# Patient Record
Sex: MALE | Race: WHITE | NOT HISPANIC OR LATINO
[De-identification: names, ages, dates, MRNs, and addresses within clinical notes are randomized per-mention and may not be internally consistent; named-entity substitution may affect disease eponyms.]

---

## 2019-06-17 PROBLEM — Z00.00 ENCOUNTER FOR PREVENTIVE HEALTH EXAMINATION: Status: ACTIVE | Noted: 2019-06-17

## 2019-06-21 ENCOUNTER — APPOINTMENT (OUTPATIENT)
Dept: OTOLARYNGOLOGY | Facility: CLINIC | Age: 39
End: 2019-06-21
Payer: COMMERCIAL

## 2019-06-21 VITALS
BODY MASS INDEX: 26.48 KG/M2 | WEIGHT: 185 LBS | DIASTOLIC BLOOD PRESSURE: 65 MMHG | HEIGHT: 70 IN | SYSTOLIC BLOOD PRESSURE: 122 MMHG | HEART RATE: 62 BPM

## 2019-06-21 DIAGNOSIS — Z80.3 FAMILY HISTORY OF MALIGNANT NEOPLASM OF BREAST: ICD-10-CM

## 2019-06-21 DIAGNOSIS — J34.2 DEVIATED NASAL SEPTUM: ICD-10-CM

## 2019-06-21 DIAGNOSIS — J31.0 CHRONIC RHINITIS: ICD-10-CM

## 2019-06-21 DIAGNOSIS — Z78.9 OTHER SPECIFIED HEALTH STATUS: ICD-10-CM

## 2019-06-21 DIAGNOSIS — Z87.09 PERSONAL HISTORY OF OTHER DISEASES OF THE RESPIRATORY SYSTEM: ICD-10-CM

## 2019-06-21 DIAGNOSIS — Z80.8 FAMILY HISTORY OF MALIGNANT NEOPLASM OF OTHER ORGANS OR SYSTEMS: ICD-10-CM

## 2019-06-21 DIAGNOSIS — H61.23 IMPACTED CERUMEN, BILATERAL: ICD-10-CM

## 2019-06-21 DIAGNOSIS — Z86.39 PERSONAL HISTORY OF OTHER ENDOCRINE, NUTRITIONAL AND METABOLIC DISEASE: ICD-10-CM

## 2019-06-21 PROCEDURE — 99213 OFFICE O/P EST LOW 20 MIN: CPT | Mod: 25

## 2019-06-21 PROCEDURE — 69210 REMOVE IMPACTED EAR WAX UNI: CPT

## 2019-06-21 PROCEDURE — 31231 NASAL ENDOSCOPY DX: CPT

## 2019-06-21 RX ORDER — CEFUROXIME AXETIL 250 MG/1
250 TABLET ORAL
Qty: 20 | Refills: 0 | Status: ACTIVE | COMMUNITY
Start: 2019-06-21 | End: 1900-01-01

## 2019-06-21 RX ORDER — FLUTICASONE PROPIONATE 50 UG/1
50 SPRAY, METERED NASAL DAILY
Qty: 1 | Refills: 3 | Status: ACTIVE | COMMUNITY
Start: 2019-06-21 | End: 1900-01-01

## 2019-06-21 RX ORDER — FINASTERIDE 1 MG/1
TABLET, FILM COATED ORAL
Refills: 0 | Status: ACTIVE | COMMUNITY

## 2019-06-21 NOTE — ASSESSMENT
[FreeTextEntry1] : He has a history of chronic rhinitis and chronic sinusitis. He has had persistent symptoms since he was sick in February. He has not been on antibiotics. On nasal endoscopy, he has nasal mucosal edema and a deviated septum.\par \par He had cerumen impaction bilaterally. The wax was removed. He felt better afterwards\par \par PLAN\par \par -findings and management options discussed in detail with the patient. \par -Nasal saline rinses, nasal steroid spray (such as fluticasone), antihistamine/decongestant as needed\par -consider trying Breathe Right strips\par -We discussed the pros and cons of placing him on antibiotics. His symptoms did start with and infection. He is going to take a course of Ceftin.\par -Allergy evaluation\par -He may require a CT scan of the sinuses\par -Reflux precautions and medication (OTC zantac) as needed (he had very mild irritation on [place])\par -He could consider nasal procedures to improve his breathing if he fails medical therapy\par -Consider a sleep study if he feels that he has difficulty sleeping\par -good aural hygiene\par -avoid using cotton swabs in the ears\par -wax removal drops as needed. \par -noise precautions\par -consider an audiogram when he returns. \par -follow up after the allergy evaluation\par -call and return earlier if any concerns. \par